# Patient Record
Sex: MALE | Race: WHITE | NOT HISPANIC OR LATINO | Employment: FULL TIME | URBAN - METROPOLITAN AREA
[De-identification: names, ages, dates, MRNs, and addresses within clinical notes are randomized per-mention and may not be internally consistent; named-entity substitution may affect disease eponyms.]

---

## 2018-06-05 PROBLEM — I82.A11 ACUTE DEEP VEIN THROMBOSIS (DVT) OF AXILLARY VEIN OF RIGHT UPPER EXTREMITY: Status: ACTIVE | Noted: 2018-06-05

## 2018-06-05 PROBLEM — K02.9 DENTAL CARIES: Status: ACTIVE | Noted: 2018-06-05

## 2018-06-05 PROBLEM — R50.9 FEVER AND CHILLS: Status: ACTIVE | Noted: 2018-06-05

## 2023-01-06 ENCOUNTER — HOSPITAL ENCOUNTER (EMERGENCY)
Facility: HOSPITAL | Age: 43
Discharge: HOME | End: 2023-01-06
Attending: STUDENT IN AN ORGANIZED HEALTH CARE EDUCATION/TRAINING PROGRAM
Payer: COMMERCIAL

## 2023-01-06 VITALS
OXYGEN SATURATION: 100 % | RESPIRATION RATE: 20 BRPM | HEART RATE: 98 BPM | WEIGHT: 200 LBS | BODY MASS INDEX: 29.62 KG/M2 | TEMPERATURE: 99 F | HEIGHT: 69 IN | DIASTOLIC BLOOD PRESSURE: 92 MMHG | SYSTOLIC BLOOD PRESSURE: 155 MMHG

## 2023-01-06 DIAGNOSIS — S61.316A LACERATION OF RIGHT LITTLE FINGER WITHOUT FOREIGN BODY WITH DAMAGE TO NAIL, INITIAL ENCOUNTER: Primary | ICD-10-CM

## 2023-01-06 PROCEDURE — 63600175 PHARM REV CODE 636 W HCPCS: Performed by: STUDENT IN AN ORGANIZED HEALTH CARE EDUCATION/TRAINING PROGRAM

## 2023-01-06 PROCEDURE — 90715 TDAP VACCINE 7 YRS/> IM: CPT | Performed by: STUDENT IN AN ORGANIZED HEALTH CARE EDUCATION/TRAINING PROGRAM

## 2023-01-06 PROCEDURE — 99284 EMERGENCY DEPT VISIT MOD MDM: CPT | Mod: 25

## 2023-01-06 PROCEDURE — 25000003 PHARM REV CODE 250: Performed by: STUDENT IN AN ORGANIZED HEALTH CARE EDUCATION/TRAINING PROGRAM

## 2023-01-06 PROCEDURE — 90471 IMMUNIZATION ADMIN: CPT | Performed by: STUDENT IN AN ORGANIZED HEALTH CARE EDUCATION/TRAINING PROGRAM

## 2023-01-06 RX ORDER — LIDOCAINE HYDROCHLORIDE 10 MG/ML
10 INJECTION, SOLUTION EPIDURAL; INFILTRATION; INTRACAUDAL; PERINEURAL
Status: COMPLETED | OUTPATIENT
Start: 2023-01-06 | End: 2023-01-06

## 2023-01-06 RX ADMIN — TETANUS TOXOID, REDUCED DIPHTHERIA TOXOID AND ACELLULAR PERTUSSIS VACCINE, ADSORBED 0.5 ML: 5; 2.5; 8; 8; 2.5 SUSPENSION INTRAMUSCULAR at 08:01

## 2023-01-06 RX ADMIN — LIDOCAINE HYDROCHLORIDE 100 MG: 10 INJECTION, SOLUTION EPIDURAL; INFILTRATION; INTRACAUDAL; PERINEURAL at 08:01

## 2023-01-07 NOTE — DISCHARGE INSTRUCTIONS
Keep your wound clean and covered.    Wash it 2-3 times a day and apply antibiotic ointment on it.

## 2023-01-07 NOTE — ED NOTES
Right pinky cleaned and irrigated with sterile water prior to cauterization. MD at bedside with suture tray.

## 2023-01-07 NOTE — ED PROVIDER NOTES
"Encounter Date: 1/6/2023    This document was partially completed using speech recognition software and may contain misspellings, grammatical errors, and/or unexpected word substitutions.       History     Chief Complaint   Patient presents with    Finger Injury     Pt arrived to ED with c/o laceration to right pinky, bleeding controlled, NADN in triage.      42 year old male with a PMHx of HTN presents to the ED with a knife injury to the distal right pinky finger and nail. He was cutting clean potatoes with a clean knife when he accidentally "shaved" his fingertip off. States it won't stop bleeding. Unknown last tdap. ROM of the finger intact.      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 hand    PERCUTANEOUS TRANSLUMINAL ANGIOPLASTY (PTA) OF PERIPHERAL VESSEL N/A 6/6/2018    Procedure: Pta-Peripheral;  Surgeon: Abraham Pereyra MD;  Location: [facility] CATH;  Service: Cardiovascular;  Laterality: N/A;    PHLEBOGRAPHY Right 6/7/2018    Procedure: Venogram;  Surgeon: Abraham Pereyra MD;  Location: [facility] CATH;  Service: Cardiovascular;  Laterality: Right;    PHLEBOGRAPHY N/A 6/8/2018    Procedure: Venogram;  Surgeon: Abraham Pereyra MD;  Location: [facility] CATH;  Service: Cardiovascular;  Laterality: N/A;    VASECTOMY       Family History   Problem Relation Age of Onset    Stroke Father     Kidney disease Father      Social History     Tobacco Use    Smoking status: Every Day     Packs/day: 0.50     Years: 0.50     Pack years: 0.25     Types: Cigarettes    Smokeless tobacco: Never   Substance Use Topics    Alcohol use: Yes     Alcohol/week: 12.0 standard drinks     Types: 12 Cans of beer per week     Comment: occasionally    Drug use: No     Review of Systems   Constitutional:  Negative for " chills and fever.   HENT:  Negative for congestion, rhinorrhea and sneezing.    Eyes:  Negative for discharge and redness.   Respiratory:  Negative for cough and shortness of breath.    Cardiovascular:  Negative for chest pain and palpitations.   Gastrointestinal:  Negative for abdominal pain, diarrhea and vomiting.   Genitourinary:  Negative for difficulty urinating, flank pain and urgency.   Musculoskeletal:  Negative for back pain and neck pain.   Skin:  Positive for wound. Negative for rash.   Neurological:  Negative for weakness, numbness and headaches.     Physical Exam     Initial Vitals [01/06/23 2037]   BP Pulse Resp Temp SpO2   (!) 165/106 98 20 98.6 °F (37 °C) 100 %      MAP       --         Physical Exam    Nursing note and vitals reviewed.  Constitutional: He appears well-developed. He is not diaphoretic. No distress.   HENT:   Head: Normocephalic and atraumatic.   Right Ear: External ear normal.   Left Ear: External ear normal.   Nose: Nose normal.   Eyes: Conjunctivae are normal. Right eye exhibits no discharge. Left eye exhibits no discharge. No scleral icterus.   Cardiovascular:  Normal rate and regular rhythm.           Pulmonary/Chest: Breath sounds normal. No stridor. No respiratory distress. He has no wheezes. He has no rhonchi. He has no rales.   Abdominal: Abdomen is soft. He exhibits no distension. There is no abdominal tenderness. There is no guarding.   Musculoskeletal:         General: No edema.      Comments: Shaved fingertip injury to the right pinky tip, part of the nail cut off; no nailbed injury    Also, base of right pinky finger has a small, soft possibly ganglion cyst - patient reports it's been there for years     Neurological: He is alert and oriented to person, place, and time. GCS score is 15. GCS eye subscore is 4. GCS verbal subscore is 5. GCS motor subscore is 6.   Skin: Skin is warm and dry. Capillary refill takes less than 2 seconds.   Psychiatric: He has a normal mood and  affect.     Patient Photos   Verbal consent was given by the patient to take photos for placement in the patient's chart for documentation reasons.        ED Course   Procedures  Labs Reviewed - No data to display       Imaging Results    None          Medications   Tdap (BOOSTRIX) vaccine injection 0.5 mL (has no administration in time range)   LIDOcaine (PF) 10 mg/ml (1%) injection 100 mg (100 mg Infiltration Given by Provider 1/6/23 2043)     Medical Decision Making:   Differential Diagnosis:   Ddx: laceration, germinal matrix injury, tetanus, flexor tendon injury  ED Management:  Based on the patient's evaluation - patient appears well for discharge home. Distal fingertip injury of the right pinky. Not repairable with sutures/glue. Cauterized injury after digital block. Updated tetanus. Will discharge home with wound care, PCP f/u PRN. Patient is in agreement.                        Clinical Impression:   Final diagnoses:  [S61.316A] Laceration of right little finger without foreign body with damage to nail, initial encounter (Primary)        ED Disposition Condition    Discharge Stable          ED Prescriptions    None       Follow-up Information       Follow up With Specialties Details Why Contact Info    Beti Shoemaker NP Internal Medicine Schedule an appointment as soon as possible for a visit in 1 week As needed [address] [address]  [phone]               Van Schilling DO  01/06/23 2049